# Patient Record
Sex: FEMALE | Race: ASIAN | NOT HISPANIC OR LATINO
[De-identification: names, ages, dates, MRNs, and addresses within clinical notes are randomized per-mention and may not be internally consistent; named-entity substitution may affect disease eponyms.]

---

## 2020-07-13 ENCOUNTER — TRANSCRIPTION ENCOUNTER (OUTPATIENT)
Age: 60
End: 2020-07-13

## 2020-07-15 ENCOUNTER — APPOINTMENT (OUTPATIENT)
Dept: ORTHOPEDIC SURGERY | Facility: CLINIC | Age: 60
End: 2020-07-15

## 2022-10-07 ENCOUNTER — EMERGENCY (EMERGENCY)
Facility: HOSPITAL | Age: 62
LOS: 1 days | Discharge: HOME | End: 2022-10-07
Admitting: STUDENT IN AN ORGANIZED HEALTH CARE EDUCATION/TRAINING PROGRAM

## 2022-10-07 VITALS
HEIGHT: 60 IN | TEMPERATURE: 98 F | RESPIRATION RATE: 16 BRPM | DIASTOLIC BLOOD PRESSURE: 68 MMHG | HEART RATE: 94 BPM | SYSTOLIC BLOOD PRESSURE: 119 MMHG | OXYGEN SATURATION: 100 %

## 2022-10-07 VITALS
HEART RATE: 78 BPM | TEMPERATURE: 98 F | OXYGEN SATURATION: 98 % | DIASTOLIC BLOOD PRESSURE: 75 MMHG | SYSTOLIC BLOOD PRESSURE: 125 MMHG | RESPIRATION RATE: 16 BRPM

## 2022-10-07 PROCEDURE — 73564 X-RAY EXAM KNEE 4 OR MORE: CPT | Mod: 26,RT

## 2022-10-07 PROCEDURE — 93971 EXTREMITY STUDY: CPT | Mod: 26,LT

## 2022-10-07 PROCEDURE — 99284 EMERGENCY DEPT VISIT MOD MDM: CPT

## 2022-10-07 RX ORDER — KETOROLAC TROMETHAMINE 30 MG/ML
15 SYRINGE (ML) INJECTION ONCE
Refills: 0 | Status: DISCONTINUED | OUTPATIENT
Start: 2022-10-07 | End: 2022-10-07

## 2022-10-07 RX ADMIN — Medication 15 MILLIGRAM(S): at 15:33

## 2022-10-07 NOTE — ED PROVIDER NOTE - PHYSICAL EXAMINATION
CONSTITUTIONAL: Well-appearing; well-nourished; in no apparent distress. Non-toxic appearing.   NEURO: Alert & oriented. Sensory and motor functions are grossly intact. walk with cane  PSYCH: Mood appropriate. Thought processes intact.   NECK: Supple  CARD: Regular rate and rhythm, no murmurs  RESP: No accessory muscle use; breath sounds clear and equal bilaterally; no wheezes, rhonchi, or rales     ABD: Soft; non-distended; non-tender.   right knee: full ROM. swelling on popliteal area. no erythema. negative Fred. negative valgus/varus. negative anterior/posterior drawer.   SKIN: Warm; dry; no apparent lesions or exudate

## 2022-10-07 NOTE — ED PROVIDER NOTE - PATIENT PORTAL LINK FT
You can access the FollowMyHealth Patient Portal offered by [facility] by registering at the following website: [email or web address]. By joining [employer]’s FollowMyHealth portal, you will also be able to view your health information using other applications (apps) compatible with our system.

## 2022-10-07 NOTE — ED PROVIDER NOTE - OBJECTIVE STATEMENT
62 yo F with DM, HLD, here for right knee pain for 3 days. pt was walking to send her granddaughter to school, and walked back home. after that pt felt severe pain to the back of her right knee. sudden onset. 10/10. feels weak in her left knee. cannot bear weight. from then on pt has to use a cane to walk, but pt never needs a cane to walk. denies fever, chills, or direct trauma to her knee.

## 2022-10-07 NOTE — ED PROVIDER NOTE - CLINICAL SUMMARY MEDICAL DECISION MAKING FREE TEXT BOX
62 yo F here for atraumatic right knee pain for 3 days. sudden onset after walking. denies fever. exam found swelling to popliteal area. full ROM. non weight bearing.   ligamental inflammation vs Baker's cyst vs arthritis. low suspicion for fracture.   will get xray of right knee and US to r/o DVT and Baker's cyst.

## 2022-10-07 NOTE — ED ADULT TRIAGE NOTE - CHIEF COMPLAINT QUOTE
Pt c/o back of knee pain /swelling  and difficulty ambulating since Tues.  Denies injury.  FS glucometer system down. unable to get FS

## 2022-10-07 NOTE — ED PROVIDER NOTE - NSICDXFAMILYHX_GEN_ALL_CORE_FT
FAMILY HISTORY:  Family history of coronary artery disease in father    Father  Still living? Unknown  Family history of CVA, Age at diagnosis: Age Unknown  Family history of diabetes mellitus, Age at diagnosis: Age Unknown    Mother  Still living? Unknown  Family history of coronary artery disease in mother, Age at diagnosis: Age Unknown  Family history of diabetes mellitus, Age at diagnosis: Age Unknown    Sibling  Still living? Unknown  Family history of diabetes mellitus, Age at diagnosis: Age Unknown

## 2022-10-07 NOTE — ED PROVIDER NOTE - NSFOLLOWUPINSTRUCTIONS_ED_ALL_ED_FT
Keep extremity elevated and wrapped.  Apply cool compresses to affected area for 15 minutes, 3-4 times per day.  Take Motrin 600 mg every 8 hours as needed for moderate pain or fevers -- take with food.  Take Tylenol 650mg (Two 325 mg pills) every 4-6 hours as needed for pain or fevers.  Follow up with orthopedics (referral list provided or call [phone] to make an appointment with the orthopedics clinic) within 1-2 weeks.   return if worsening pain, swelling, becoming warm to touch, cannot move your knee. Keep extremity elevated and wrapped.  Apply cool compresses to affected area for 15 minutes, 3-4 times per day. continue to use cane to assist walking.  Take Motrin 600 mg every 8 hours as needed for moderate pain or fevers -- take with food.  Take Tylenol 650mg (Two 325 mg pills) every 4-6 hours as needed for pain or fevers.  Follow up with orthopedics (referral list provided or call [phone] to make an appointment with the orthopedics clinic) within 1-2 weeks.   return if worsening pain, swelling, becoming warm to touch, cannot move your knee.

## 2022-10-07 NOTE — ED ADULT NURSE NOTE - OBJECTIVE STATEMENT
Pt arriving to RW procedure room A&OX4 ambulatory with a cane c/o R knee pain. Denies trauma/injury to RLE. Endorsing swelling. Evaluated by PA. Awaiting XR, US. Son at bedside.

## 2022-10-26 ENCOUNTER — OUTPATIENT (OUTPATIENT)
Dept: OUTPATIENT SERVICES | Facility: HOSPITAL | Age: 62
LOS: 1 days | End: 2022-10-26

## 2022-10-26 ENCOUNTER — APPOINTMENT (OUTPATIENT)
Dept: ORTHOPEDIC SURGERY | Facility: HOSPITAL | Age: 62
End: 2022-10-26

## 2022-10-26 VITALS
BODY MASS INDEX: 26.11 KG/M2 | DIASTOLIC BLOOD PRESSURE: 59 MMHG | HEART RATE: 90 BPM | WEIGHT: 133 LBS | SYSTOLIC BLOOD PRESSURE: 118 MMHG | HEIGHT: 60 IN | TEMPERATURE: 97.9 F

## 2022-10-26 DIAGNOSIS — S83.209A UNSPECIFIED TEAR OF UNSPECIFIED MENISCUS, CURRENT INJURY, UNSPECIFIED KNEE, INITIAL ENCOUNTER: ICD-10-CM

## 2022-10-27 DIAGNOSIS — S83.209A UNSPECIFIED TEAR OF UNSPECIFIED MENISCUS, CURRENT INJURY, UNSPECIFIED KNEE, INITIAL ENCOUNTER: ICD-10-CM

## 2024-12-06 ENCOUNTER — EMERGENCY (EMERGENCY)
Facility: HOSPITAL | Age: 64
LOS: 1 days | Discharge: HOME | End: 2024-12-06
Attending: STUDENT IN AN ORGANIZED HEALTH CARE EDUCATION/TRAINING PROGRAM | Admitting: STUDENT IN AN ORGANIZED HEALTH CARE EDUCATION/TRAINING PROGRAM
Payer: MEDICARE

## 2024-12-06 VITALS
RESPIRATION RATE: 18 BRPM | OXYGEN SATURATION: 98 % | HEART RATE: 82 BPM | DIASTOLIC BLOOD PRESSURE: 76 MMHG | SYSTOLIC BLOOD PRESSURE: 114 MMHG | TEMPERATURE: 98 F

## 2024-12-06 VITALS
HEART RATE: 18 BPM | WEIGHT: 125 LBS | SYSTOLIC BLOOD PRESSURE: 141 MMHG | TEMPERATURE: 98 F | DIASTOLIC BLOOD PRESSURE: 92 MMHG | RESPIRATION RATE: 18 BRPM | OXYGEN SATURATION: 98 %

## 2024-12-06 LAB
ALBUMIN SERPL ELPH-MCNC: 4.2 G/DL — SIGNIFICANT CHANGE UP (ref 3.3–5)
ALP SERPL-CCNC: 72 U/L — SIGNIFICANT CHANGE UP (ref 40–120)
ALT FLD-CCNC: 19 U/L — SIGNIFICANT CHANGE UP (ref 4–33)
ANION GAP SERPL CALC-SCNC: 13 MMOL/L — SIGNIFICANT CHANGE UP (ref 7–14)
APPEARANCE UR: ABNORMAL
AST SERPL-CCNC: 22 U/L — SIGNIFICANT CHANGE UP (ref 4–32)
BACTERIA # UR AUTO: ABNORMAL /HPF
BASOPHILS # BLD AUTO: 0.05 K/UL — SIGNIFICANT CHANGE UP (ref 0–0.2)
BASOPHILS NFR BLD AUTO: 0.4 % — SIGNIFICANT CHANGE UP (ref 0–2)
BILIRUB SERPL-MCNC: 1 MG/DL — SIGNIFICANT CHANGE UP (ref 0.2–1.2)
BILIRUB UR-MCNC: ABNORMAL
BUN SERPL-MCNC: 13 MG/DL — SIGNIFICANT CHANGE UP (ref 7–23)
CALCIUM SERPL-MCNC: 9.3 MG/DL — SIGNIFICANT CHANGE UP (ref 8.4–10.5)
CAST: 0 /LPF — SIGNIFICANT CHANGE UP (ref 0–4)
CHLORIDE SERPL-SCNC: 98 MMOL/L — SIGNIFICANT CHANGE UP (ref 98–107)
CO2 SERPL-SCNC: 21 MMOL/L — LOW (ref 22–31)
COLOR SPEC: ABNORMAL
CREAT SERPL-MCNC: 0.57 MG/DL — SIGNIFICANT CHANGE UP (ref 0.5–1.3)
DIFF PNL FLD: ABNORMAL
EGFR: 101 ML/MIN/1.73M2 — SIGNIFICANT CHANGE UP
EOSINOPHIL # BLD AUTO: 0.03 K/UL — SIGNIFICANT CHANGE UP (ref 0–0.5)
EOSINOPHIL NFR BLD AUTO: 0.2 % — SIGNIFICANT CHANGE UP (ref 0–6)
GLUCOSE SERPL-MCNC: 150 MG/DL — HIGH (ref 70–99)
GLUCOSE UR QL: 500 MG/DL
HCT VFR BLD CALC: 39.6 % — SIGNIFICANT CHANGE UP (ref 34.5–45)
HGB BLD-MCNC: 12.7 G/DL — SIGNIFICANT CHANGE UP (ref 11.5–15.5)
IANC: 10.83 K/UL — HIGH (ref 1.8–7.4)
IMM GRANULOCYTES NFR BLD AUTO: 0.4 % — SIGNIFICANT CHANGE UP (ref 0–0.9)
KETONES UR-MCNC: NEGATIVE MG/DL — SIGNIFICANT CHANGE UP
LEUKOCYTE ESTERASE UR-ACNC: ABNORMAL
LYMPHOCYTES # BLD AUTO: 15 % — SIGNIFICANT CHANGE UP (ref 13–44)
LYMPHOCYTES # BLD AUTO: 2.1 K/UL — SIGNIFICANT CHANGE UP (ref 1–3.3)
MCHC RBC-ENTMCNC: 27.7 PG — SIGNIFICANT CHANGE UP (ref 27–34)
MCHC RBC-ENTMCNC: 32.1 G/DL — SIGNIFICANT CHANGE UP (ref 32–36)
MCV RBC AUTO: 86.5 FL — SIGNIFICANT CHANGE UP (ref 80–100)
MONOCYTES # BLD AUTO: 0.94 K/UL — HIGH (ref 0–0.9)
MONOCYTES NFR BLD AUTO: 6.7 % — SIGNIFICANT CHANGE UP (ref 2–14)
NEUTROPHILS # BLD AUTO: 10.83 K/UL — HIGH (ref 1.8–7.4)
NEUTROPHILS NFR BLD AUTO: 77.3 % — HIGH (ref 43–77)
NITRITE UR-MCNC: POSITIVE
NRBC # BLD: 0 /100 WBCS — SIGNIFICANT CHANGE UP (ref 0–0)
NRBC # FLD: 0 K/UL — SIGNIFICANT CHANGE UP (ref 0–0)
PH UR: 5 — SIGNIFICANT CHANGE UP (ref 5–8)
PLATELET # BLD AUTO: 286 K/UL — SIGNIFICANT CHANGE UP (ref 150–400)
POTASSIUM SERPL-MCNC: 4.6 MMOL/L — SIGNIFICANT CHANGE UP (ref 3.5–5.3)
POTASSIUM SERPL-SCNC: 4.6 MMOL/L — SIGNIFICANT CHANGE UP (ref 3.5–5.3)
PROT SERPL-MCNC: 7.7 G/DL — SIGNIFICANT CHANGE UP (ref 6–8.3)
PROT UR-MCNC: 100 MG/DL
RBC # BLD: 4.58 M/UL — SIGNIFICANT CHANGE UP (ref 3.8–5.2)
RBC # FLD: 12.3 % — SIGNIFICANT CHANGE UP (ref 10.3–14.5)
RBC CASTS # UR COMP ASSIST: HIGH /HPF (ref 0–4)
REVIEW: SIGNIFICANT CHANGE UP
SODIUM SERPL-SCNC: 132 MMOL/L — LOW (ref 135–145)
SP GR SPEC: 1.02 — SIGNIFICANT CHANGE UP (ref 1–1.03)
SQUAMOUS # UR AUTO: 3 /HPF — SIGNIFICANT CHANGE UP (ref 0–5)
UROBILINOGEN FLD QL: 0.2 MG/DL — SIGNIFICANT CHANGE UP (ref 0.2–1)
WBC # BLD: 14.01 K/UL — HIGH (ref 3.8–10.5)
WBC # FLD AUTO: 14.01 K/UL — HIGH (ref 3.8–10.5)
WBC UR QL: 637 /HPF — HIGH (ref 0–5)

## 2024-12-06 PROCEDURE — 74177 CT ABD & PELVIS W/CONTRAST: CPT | Mod: 26,MC

## 2024-12-06 PROCEDURE — 99285 EMERGENCY DEPT VISIT HI MDM: CPT

## 2024-12-06 PROCEDURE — 93010 ELECTROCARDIOGRAM REPORT: CPT

## 2024-12-06 RX ORDER — SODIUM CHLORIDE 9 MG/ML
500 INJECTION, SOLUTION INTRAMUSCULAR; INTRAVENOUS; SUBCUTANEOUS ONCE
Refills: 0 | Status: COMPLETED | OUTPATIENT
Start: 2024-12-06 | End: 2024-12-06

## 2024-12-06 RX ORDER — ACETAMINOPHEN 500MG 500 MG/1
1000 TABLET, COATED ORAL ONCE
Refills: 0 | Status: COMPLETED | OUTPATIENT
Start: 2024-12-06 | End: 2024-12-06

## 2024-12-06 RX ORDER — PHENAZOPYRIDINE HCL 200 MG
1 TABLET ORAL
Qty: 6 | Refills: 0
Start: 2024-12-06 | End: 2024-12-07

## 2024-12-06 RX ORDER — 0.9 % SODIUM CHLORIDE 0.9 %
1000 INTRAVENOUS SOLUTION INTRAVENOUS ONCE
Refills: 0 | Status: COMPLETED | OUTPATIENT
Start: 2024-12-06 | End: 2024-12-06

## 2024-12-06 RX ORDER — CEPHALEXIN 500 MG
1 CAPSULE ORAL
Qty: 20 | Refills: 0
Start: 2024-12-06 | End: 2024-12-15

## 2024-12-06 RX ORDER — KETOROLAC TROMETHAMINE 30 MG/ML
15 INJECTION INTRAMUSCULAR; INTRAVENOUS ONCE
Refills: 0 | Status: DISCONTINUED | OUTPATIENT
Start: 2024-12-06 | End: 2024-12-06

## 2024-12-06 RX ORDER — CEFTRIAXONE SODIUM 1 G
1000 VIAL (EA) INJECTION ONCE
Refills: 0 | Status: COMPLETED | OUTPATIENT
Start: 2024-12-06 | End: 2024-12-06

## 2024-12-06 RX ADMIN — KETOROLAC TROMETHAMINE 15 MILLIGRAM(S): 30 INJECTION INTRAMUSCULAR; INTRAVENOUS at 12:48

## 2024-12-06 RX ADMIN — ACETAMINOPHEN 500MG 1000 MILLIGRAM(S): 500 TABLET, COATED ORAL at 09:43

## 2024-12-06 RX ADMIN — Medication 1000 MILLILITER(S): at 09:13

## 2024-12-06 RX ADMIN — SODIUM CHLORIDE 500 MILLILITER(S): 9 INJECTION, SOLUTION INTRAMUSCULAR; INTRAVENOUS; SUBCUTANEOUS at 12:48

## 2024-12-06 RX ADMIN — Medication 100 MILLIGRAM(S): at 12:48

## 2024-12-06 RX ADMIN — ACETAMINOPHEN 500MG 400 MILLIGRAM(S): 500 TABLET, COATED ORAL at 09:13

## 2024-12-06 NOTE — ED PROVIDER NOTE - PATIENT PORTAL LINK FT
You can access the FollowMyHealth Patient Portal offered by [facility] by registering at the following website: [email or web address]. By joining [employer]’s FollowMyHealth portal, you will also be able to view your health information using other applications (apps) compatible with our system.

## 2024-12-06 NOTE — ED PROVIDER NOTE - ATTENDING CONTRIBUTION TO CARE
This is a 64-year-old female with a past medical history of hypertension, hyperlipidemia, diabetes who presents to the ED complaining of dysuria, hematuria, pelvic pain that radiates to the back since this morning.  She denies any fever chills or diaphoresis.  Patient denies any history of UTI or nephrolithiasis.      Physical exam:  Overall well-appearing female in no acute distress  Vital signs within acceptable range  Heart is regular rate rhythm without murmurs or gallops  Lungs are clear to auscultation all lung fields as well as rhonchi  Abdomen is soft, minimally tender to the suprapubic region without rebound or guarding.  No CVA tenderness.    MDM:  Patient presents to the ED with urinary symptoms.  Vital signs within acceptable range.    My interpretation of lab studies shows leukocytosis of 14,000 with a left shift, no anemia.  Renal function within normal limits.  Mild hyperglycemia to 150.  Sodium 132.  I have a low suspicion for DKA.  Urinalysis consistent with urinary tract infection with positive leuks and nitrites.  Patient also there is moderate blood and small bilirubin.    CT imaging of the abdomen pelvis with IV contrast consistent with cystitis.    Patient started on Rocephin, she will be given analgesia with acetaminophen, this did help.     Patient was reassessed, feeling significantly better.  Patient discharged close outpatient follow-up and strict return precautions.  Sent home With a prescription for Keflex and Pyridium.      Patient felt comfortable with discharge.  She is overall well-appearing

## 2024-12-06 NOTE — ED PROVIDER NOTE - PHYSICAL EXAMINATION
PHYSICAL EXAM  General: Nontoxic, getting into hospital gown without distress or difficulty, mild anxiety  HEENT: Normocephalic. Atraumatic. Normal icterus. MM slightly dry.   Heart: Regular rate, rhythm. Normal S1/S2. No murmurs.   Lungs: Clear to ausculation bilaterally, no wheezes, rhonchi. Normal effort.   GI: Soft, non-distended, mild rebound localized to RLQ with LLQ palpation. Mild RLQ pain compared to LLQ, nontender RUQ/epigastric region. No guarding. No CVA tenderness.   Back: No midline back pain in c/t/l. There is an area of discoloration on mid-back bilaterally, nontender, no rash, no vesicles, no lesions. Similar appearing discoloration overlying upper shoulders that was nontender.   Neuro: Alert, oriented. No obvious focal deficits.   Ext: Peripheral pulses intact. No lower extremity edema.  Skin: Warm, well-perfused

## 2024-12-06 NOTE — ED PROVIDER NOTE - NSCAREINITIATED _GEN_ER
----- Message from Sebastian Ni sent at 6/6/2024  2:32 PM CDT -----  Contact: [phone]  Type:  Needs Medical Advice    Who Called: Gillwalter Mayoas  Symptoms (please be specific): Dry Eye   How long has patient had these symptoms:  Since her sx  Would the patient rather a call back or a response via [facility]? Call Back  Best Call Back Number: [phone]  Additional Information: Pt is calling and states she wakes up at night and her eyes feel dry. She was trying to see if it is ok to put something in her eyes to moisten them. Also wants to know what is recommended. Please call back to further assist.   Eileen Macedo(Resident)

## 2024-12-06 NOTE — ED ADULT TRIAGE NOTE - CHIEF COMPLAINT QUOTE
Pt arrives ambulatory to triage c/o hematuria, dysuria, and lower abdominal pain that began this AM. Takes daily ASA. PHx: DM, HTN, HLD.

## 2024-12-06 NOTE — ED PROVIDER NOTE - OBJECTIVE STATEMENT
Patient is a 65yo woman with a history of IDDM, HTN, HLD who presents to the ER for evaluation of abdominal pain and hematuria.  Patient reports she developed lower abdominal pain 2 days ago.  States yesterday urination was normal but today she had dysuria and noticed significant blood in urine at around 4 in the morning.  Denies history of stones.  This is never happened before.  No fever, nausea, vomiting, diarrhea.  No blood thinner use and is only taking baby aspirin.  She additionally reports noticing a darker discoloration of skin in her mid back when she looked in the mirror, not sure when this happened, no pain, no trauma.  She also feels anxious.  No other concerns today.

## 2024-12-06 NOTE — ED PROVIDER NOTE - NSFOLLOWUPINSTRUCTIONS_ED_ALL_ED_FT
SUMMARY  You were seen in the ER on 12/6/24 for evaluation of abdominal pain and blood in the urine. We did testing and you have a UTI (urinary tract infection) of your bladder. We also did a CT of your abdomen/pelvis that showed bladder infection, but no other abnormalities to explain your symptoms. We gave you antibiotics and fluids and you felt better. You are safe to go home and take the antibiotic until completion. Please follow the recommendations below. Thank you for allowing us to care for you!    RECOMMENDATIONS  For Pain  - You can take Acetaminophen (Tylenol) 650mg-1000mg every 6 hours as needed (max 4000mg/day)  - You can additionally take Ibuprofen (Motrin) 400-600mg every 6 hours as needed (max 3200mg/day)    For Urinary Tract Infection  - Take prescribed Keflex 500mg twice daily for 10 days  - Stay hydrated by drinking water or teas  - You can try Phenazopyridine 200mg (1 pill) every 8 hours as needed for pain with peeing  (this will numb your urinary tract so the pain is less, but it will make your urine bright orange and this is normal while taking the medication. If you do not have burning when peeing, you do not need to take this)    SCHEDULE APPOINTMENT WITH  1. Your regular doctor if you are wanting follow up or check up or for questions    RETURN TO THE ER...  For any worsening symptoms or concerns, including chest pain, shortness of breath, lightheadedness, weakness, fever, inability to walk, severe pain, or anything else concerning. You should also come back if you have fever and worsening abdominal pain even though you are taking antibiotics and tylenol.

## 2024-12-06 NOTE — ED PROVIDER NOTE - PROGRESS NOTE DETAILS
Reviewed labs: leukocytosis, mild, with UTI (+LE, +Nitrates) and blood. CTAP showing cystitis. Plan for 1g ceftriaxone here and will discharge with Keflex for 10 days. Patient given toradol for pain and additional 500mL NS for mild hyponatremia. Will reassess once complete and discharge home with outpt follow up and abx. Reviewed labs: leukocytosis, mild, with UTI (+LE, +Nitrates) and blood. CTAP showing cystitis. Plan for 1g ceftriaxone here and will discharge with abx. Patient given toradol for pain and additional 500mL NS for mild hyponatremia. Will reassess once complete and discharge home with outpt follow up and Keflex 500mg BID for 10 days.

## 2024-12-06 NOTE — ED ADULT NURSE NOTE - OBJECTIVE STATEMENT
Pt's spouse Herschell Dye called stating Pt does have a PCP and pt is having some leg swelling and fluid is oozing out of her legs.  Call was transferred to nurse triage and information given to BASIM Duron
A&Ox4. ambulatory. c/o lower back pain, vaginal bleeding, pain upon urination and lower ABD pain since this morning. PT is diabetic type 2 and takes insulin and metformin.  NAD. pt denies SOB, chest pain, dizziness, weakness, urinary symptoms, HA, n/v/d, fevers, chills. respirations are even and un labored. ABD is soft with positive pain upon palpation to LL & RL ABD quads. skin intact. 20g placed to LAC. labs drawn and sent. safety precautions maintained.

## 2024-12-06 NOTE — ED PROVIDER NOTE - CLINICAL SUMMARY MEDICAL DECISION MAKING FREE TEXT BOX
This is a 64yoF with a history of IDDM, HTN, HLD who presents to the ER for evaluation of hematuria and lower abdominal pain.  She developed abdominal pain 2 days ago which has worsened and is noticing dysuria with hematuria that began this morning.  No blood thinner use.  This is never happened before.  Vitals on arrival were appropriate. Physical notable for lower abdominal tenderness, mild, worse on R, no CVA tenderness. RRR, clear lungs, no edema. DDx: UTI vs pyelo vs stone vs malignancy. Will plan for CTAP in addition to CBC, CMP, UA/UCx, EKG. Giving Acetaminophen for pain for now.

## 2024-12-07 LAB
CULTURE RESULTS: SIGNIFICANT CHANGE UP
SPECIMEN SOURCE: SIGNIFICANT CHANGE UP

## 2025-01-14 NOTE — ED ADULT NURSE REASSESSMENT NOTE - NS ED NURSE REASSESS COMMENT FT1
Leuk- neg  Nitrate- neg  Blood- neg  Ketones- neg       Pt resting in stretcher A&Ox4, ambulatory with steady gait complaining of worsening abdominal pain at this time. MD aware. medicated as ordered. RR equal and unlabored. Care plan continued. Comfort measures provided. Safety maintained. Awaiting further orders.